# Patient Record
Sex: MALE | Race: OTHER | Employment: FULL TIME | URBAN - METROPOLITAN AREA
[De-identification: names, ages, dates, MRNs, and addresses within clinical notes are randomized per-mention and may not be internally consistent; named-entity substitution may affect disease eponyms.]

---

## 2018-03-01 ENCOUNTER — OFFICE VISIT (OUTPATIENT)
Dept: FAMILY MEDICINE CLINIC | Age: 53
End: 2018-03-01

## 2018-03-01 VITALS
SYSTOLIC BLOOD PRESSURE: 116 MMHG | BODY MASS INDEX: 23.3 KG/M2 | DIASTOLIC BLOOD PRESSURE: 72 MMHG | WEIGHT: 145 LBS | HEIGHT: 66 IN | TEMPERATURE: 97.9 F | HEART RATE: 53 BPM

## 2018-03-01 DIAGNOSIS — Z23 ENCOUNTER FOR IMMUNIZATION: ICD-10-CM

## 2018-03-01 DIAGNOSIS — Z13.9 ENCOUNTER FOR SCREENING: ICD-10-CM

## 2018-03-01 DIAGNOSIS — R39.15 URGENCY OF URINATION: Primary | ICD-10-CM

## 2018-03-01 LAB
BILIRUB UR QL STRIP: NEGATIVE
GLUCOSE POC: NORMAL MG/DL
GLUCOSE UR-MCNC: NEGATIVE MG/DL
KETONES P FAST UR STRIP-MCNC: NEGATIVE MG/DL
PH UR STRIP: 5 [PH] (ref 4.6–8)
PROT UR QL STRIP: NEGATIVE
SP GR UR STRIP: 1.02 (ref 1–1.03)
UA UROBILINOGEN AMB POC: NORMAL (ref 0.2–1)
URINALYSIS CLARITY POC: CLEAR
URINALYSIS COLOR POC: YELLOW
URINE BLOOD POC: NEGATIVE
URINE LEUKOCYTES POC: NEGATIVE
URINE NITRITES POC: NEGATIVE

## 2018-03-01 RX ORDER — OXYBUTYNIN CHLORIDE 5 MG/1
5 TABLET ORAL 2 TIMES DAILY
Qty: 60 TAB | Refills: 11 | Status: SHIPPED | OUTPATIENT
Start: 2018-03-01 | End: 2018-07-21

## 2018-03-01 NOTE — PROGRESS NOTES
Coordination of Care  1. Have you been to the ER, urgent care clinic since your last visit? Hospitalized since your last visit? No    2. Have you seen or consulted any other health care providers outside of the [address] [place] [address] since your last visit? Include any pap smears or colon screening. No    Does the patient need refills?  YES    Learning Assessment Complete? yes     Results for orders placed or performed in visit on 03/01/18   AMB POC URINALYSIS DIP STICK MANUAL W/O MICRO   Result Value Ref Range    Color (UA POC) Yellow     Clarity (UA POC) Clear     Glucose (UA POC) Negative Negative    Bilirubin (UA POC) Negative Negative    Ketones (UA POC) Negative Negative    Specific gravity (UA POC) 1.025 1.001 - 1.035    Blood (UA POC) Negative Negative    pH (UA POC) 5.0 4.6 - 8.0    Protein (UA POC) Negative Negative    Urobilinogen (UA POC) normal 0.2 - 1    Nitrites (UA POC) Negative Negative    Leukocyte esterase (UA POC) Negative Negative   AMB POC GLUCOSE BLOOD, BY GLUCOSE MONITORING DEVICE   Result Value Ref Range    Glucose POC non fasting 87 mg/dL

## 2018-03-01 NOTE — PROGRESS NOTES
Safend phone  #[phone]. Requests flu vaccine; denies fever, egg allergy. Immunization given per protocol and recorded in [address] [place]. VIS information sheet given, explained possible S/E. Reviewed sx indicating need to be seen in ER. Pt had no adverse reaction at time of discharge. See scanned form. AVS printed and reviewed. E script discussed and Good Rx coupon printed for cost at [facility] approx $19. F/u as needed.

## 2018-03-01 NOTE — PROGRESS NOTES
Subjective:     Chief Complaint   Patient presents with    Enuresis        He  is a 47 y.o. male who presents for evaluation of urinary frequency. Pt reports going to the bathroom q2-3 hours during the day w/ 2-3 nocturnal awakenings. S&S resume approx 2 months ago. Original onset approx 3-4 years ago and was Rx'd by Ditropan by urologist.     Pt reports ditropan was effective in normalizing his urine output. No associated dysuria, pelvic pain nor abdominal pain. No other acute concerns. ROS  Gen - no fever/chills  Resp - no dyspnea or cough  CV - no chest pain or GALLARDO  Rest per HPI    No past medical history on file. No past surgical history on file. Current Outpatient Prescriptions on File Prior to Visit   Medication Sig Dispense Refill    raNITIdine (ZANTAC) 75 mg tablet Take 75 mg by mouth two (2) times a day.  oxybutynin (DITROPAN) 5 mg tablet Take 1 Tab by mouth three (3) times daily for 90 days. [place] 1 tableta 3 veces al jud 90 Tab 2     No current facility-administered medications on file prior to visit.          Objective:     Vitals:    03/01/18 1134   BP: 116/72   Pulse: (!) 53   Temp: 97.9 °F (36.6 °C)   Weight: 145 lb (65.8 kg)   Height: 5' 6.34\" (1.685 m)       Physical Examination:  General appearance - alert, well appearing, and in no distress    Recent Results (from the past 12 hour(s))   AMB POC GLUCOSE BLOOD, BY GLUCOSE MONITORING DEVICE    Collection Time: 03/01/18 11:40 AM   Result Value Ref Range    Glucose POC non fasting 87 mg/dL   AMB POC URINALYSIS DIP STICK MANUAL W/O MICRO    Collection Time: 03/01/18 11:51 AM   Result Value Ref Range    Color (UA POC) Yellow     Clarity (UA POC) Clear     Glucose (UA POC) Negative Negative    Bilirubin (UA POC) Negative Negative    Ketones (UA POC) Negative Negative    Specific gravity (UA POC) 1.025 1.001 - 1.035    Blood (UA POC) Negative Negative    pH (UA POC) 5.0 4.6 - 8.0    Protein (UA POC) Negative Negative Urobilinogen (UA POC) normal 0.2 - 1    Nitrites (UA POC) Negative Negative    Leukocyte esterase (UA POC) Negative Negative           Assessment/ Plan:   Diagnoses and all orders for this visit:    1. Urgency of urination  -     oxybutynin (DITROPAN) 5 mg tablet; Take 1 Tab by mouth two (2) times a day. [place] 1 tableta dos veces al jud    2. Encounter for screening  -     AMB POC URINALYSIS DIP STICK MANUAL W/O MICRO  -     AMB POC GLUCOSE BLOOD, BY GLUCOSE MONITORING DEVICE       Reviewed prior urology note and recommend resuming Ditropan BID. Advised Pt if his S&S do not improve w/ Rx use, that he needs to RTC for further eval.     POC testing unremarkable. I have discussed the diagnosis with the patient and the intended plan as seen in the above orders. The patient has received an after-visit summary and questions were answered concerning future plans. I have discussed medication side effects and warnings with the patient as well. The patient verbalizes understanding and agreement with the plan.     Follow-up Disposition: Not on File

## 2018-03-01 NOTE — PATIENT INSTRUCTIONS
Hiperplasia prostática benigna: Instrucciones de cuidado - [ Benign Prostatic Hyperplasia: Care Instructions ]  Instrucciones de cuidado    La hiperplasia prostática benigna (BPH, por sp siglas [facility] es un agrandamiento de la próstata. La próstata es nata glándula pequeña que produce parte del líquido del semen. El agrandamiento de la próstata ocurre en eliazar todos los hombres a medida que envejecen. Por lo general, no es grave. La BPH no causa cáncer de próstata. A medida que la próstata se agranda, podría obstruir de [place] parcial el flujo de la [place]. Es posible que tenga dificultades para empezar o detener por completo el flujo de [place]. La BPH puede causar goteo de [place]. Es posible que tenga un flujo débil de [place], o que tenga que orinar con más frecuencia de lo habitual, sobre todo por la noche. La mayoría de los hombres encuentran que estos problemas son fáciles de [place]. Usted no necesita tratamiento a menos que sp síntomas le molesten mucho o que tenga otros problemas, tales cadence infecciones en la vejiga o cálculos. En [facility], los medicamentos podrían ayudar. No se necesita [facility], a menos que el flujo de [place] esté bloqueado o los síntomas no mejoren con los [employer]. La atención de seguimiento es nata parte clave de swenson tratamiento y seguridad. Asegúrese de hacer y acudir a todas las citas, y llame a swenson médico si está teniendo problemas. También es nata buena idea saber los resultados de los exámenes y mantener nata lista de los medicamentos que gabi. ¿Cómo puede cuidarse en el hogar? · Tómese tiempo suficiente para orinar. Trate de relajarse. · Intente hacer la \"doble micción\". Orine todo lo que pueda, relájese sherri algunos momentos y [place] trate de volver a orinar. · Siéntese en el inodoro para orinar. · Melissa o piense en otras cosas mientras espera. · Wanda Marrow llave de agua o trate de imaginarse el agua corriendo.  A algunos hombres esto les ayuda a comenzar el flujo de orina.  · Si el goteo es un problema, lávese el pene a diario para evitar la irritación e infección de la piel. · Evite la cafeína y el alcohol. Estas bebidas aumentarán la frecuencia con la que necesita orinar. Distribuya el consumo de líquidos a lo ashleigh del día. Si el deseo urgente de orinar le despierta con frecuencia por la noche, limite el consumo de líquidos por la tarde. Orine loraine antes de ir a la cama. · Muchos medicamentos de venta nilda para el resfriado y las alergias pueden empeorar los síntomas de la BPH. Si puede, evite los antihistamínicos, los descongestionantes y las pastillas para las [place]. Melissa las advertencias del envase. · Si gabi algún medicamento recetado, en especial tranquilizantes o antidepresivos, pregúntele a swenson médico o farmacéutico si pueden causarle problemas urinarios. Podría radhika otros medicamentos que puede usar que no causan problemas urinarios. · Sea aron con los medicamentos. [employer] medicamentos exactamente cadence le fueron recetados. Llame a swenson médico si martin estar teniendo problemas con swenson medicamento. ¿Cuándo debe pedir ayuda? Llame a swenson médico ahora mismo o busque atención médica inmediata si:  ? · No puede orinar en absoluto. ? · Presenta síntomas de nata infección urinaria. Por ejemplo:  ¨ Tiene rose o pus en la orina. ¨ Tiene dolor de espalda loraine debajo de las [place]. Encantado se llama dolor en el flanco.  ¨ Tiene fiebre, escalofríos o pina por todo el cuerpo. ¨ Siente dolor al [place]. ¨ Tiene dolor en el abdomen o la jacquelin. ?Preste especial atención a los cambios en swenson kimberley y asegúrese de comunicarse con swenson médico si:  ? · Siente dolor cuando [place]. ? · Dary problemas urinarios empeoran mucho o le [place]. ¿Dónde puede encontrar más información en inglés? Deedee Burdick a http://bulmaro-nadira.info/. Glenn  D122 en la búsqueda para aprender más acerca de \"Hiperplasia prostática benigna:  Instrucciones de cuidado - [ Benign Prostatic Hyperplasia: Care Instructions ]. \"  Revisado: 14 marzo, 2017  Versión del contenido: 11.4  © 5316-4596 Healthwise, Incorporated. Las instrucciones de cuidado fueron adaptadas bajo licencia por [employer] (which disclaims liability or warranty for this information). Si usted tiene Esperance Russell afección médica o sobre estas instrucciones, siempre pregunte a swenson profesional de kimberley. Healthwise, Incorporated niega toda garantía o responsabilidad por swenson uso de esta información.

## 2018-07-21 ENCOUNTER — OFFICE VISIT (OUTPATIENT)
Dept: FAMILY MEDICINE CLINIC | Age: 53
End: 2018-07-21

## 2018-07-21 VITALS
TEMPERATURE: 98.3 F | SYSTOLIC BLOOD PRESSURE: 117 MMHG | BODY MASS INDEX: 23.61 KG/M2 | HEIGHT: 67 IN | DIASTOLIC BLOOD PRESSURE: 71 MMHG | WEIGHT: 150.4 LBS | HEART RATE: 53 BPM

## 2018-07-21 DIAGNOSIS — Z13.9 ENCOUNTER FOR SCREENING: Primary | ICD-10-CM

## 2018-07-21 DIAGNOSIS — M54.9 MECHANICAL BACK PAIN: ICD-10-CM

## 2018-07-21 DIAGNOSIS — R05.9 COUGH: ICD-10-CM

## 2018-07-21 DIAGNOSIS — R35.1 NOCTURIA: ICD-10-CM

## 2018-07-21 LAB
S PYO AG THROAT QL: NEGATIVE
VALID INTERNAL CONTROL?: YES

## 2018-07-21 RX ORDER — DOXAZOSIN 1 MG/1
1 TABLET ORAL DAILY
Qty: 30 TAB | Refills: 2 | Status: SHIPPED | OUTPATIENT
Start: 2018-07-21

## 2018-07-21 RX ORDER — LORATADINE 10 MG/1
10 TABLET ORAL DAILY
Qty: 30 TAB | Refills: 0 | Status: SHIPPED | OUTPATIENT
Start: 2018-07-21

## 2018-07-21 RX ORDER — NAPROXEN 500 MG/1
500 TABLET ORAL 2 TIMES DAILY WITH MEALS
Qty: 60 TAB | Refills: 1 | Status: SHIPPED | OUTPATIENT
Start: 2018-07-21

## 2018-07-21 NOTE — PROGRESS NOTES
Coordination of Care  1. Have you been to the ER, urgent care clinic since your last visit? Hospitalized since your last visit? No    2. Have you seen or consulted any other health care providers outside of the [address] [place] [address] since your last visit? Include any pap smears or colon screening. No    Does the patient need refills? YES    Learning Assessment Complete?  yes  Depression Screening complete in the past 12 months? yes     Results for orders placed or performed in visit on 07/21/18   AMB POC RAPID STREP A   Result Value Ref Range    VALID INTERNAL CONTROL POC Yes     Group A Strep Ag Negative Negative

## 2018-07-21 NOTE — PATIENT INSTRUCTIONS
Tos crónica: Instrucciones de cuidado - [ Chronic Cough: Care Instructions ]  Instrucciones de cuidado    La tos es nata respuesta del cuerpo a algo que molesta a la garganta o a las vías respiratorias. La tos puede ser provocada por muchas cosas. Usted podría toser debido a un resfriado o a la gripe, a nata bronquitis o al asma. Fumar, el goteo posnasal, las alergias y el ácido estomacal que regresa a swenson garganta también pueden causar tos. La tos puede ser a corto plazo (aguda) o a ashleigh plazo (crónica). La tos crónica dura más de 3 semanas. La tos crónica suele estar causada por un problema a ashleigh plazo, cadence, por ejemplo, el asma. Otra causa puede ser un medicamento, yogesh cadence un inhibidor de la ECA (enzima convertidora de la angiotensina). La tos es un síntoma, no nata enfermedad. Para tratar la tos crónica, es posible que tenga que tratar el problema que la causa. Puede jagdish algunas medidas en swenson hogar para toser menos y sentirse mejor. Algunas personas tosen o carraspean por costumbre, sin razón aparente. La atención de seguimiento es nata parte clave de swenson tratamiento y seguridad. Asegúrese de hacer y acudir a todas las citas, y llame a swenson médico si está teniendo problemas. También es nata buena idea saber los resultados de sp exámenes y mantener nata lista de los medicamentos que gabi. ¿Cómo puede cuidarse en el hogar? · Sabrina abundante agua y otros líquidos. [place] podría ayudar a aliviar la garganta seca o adolorida. La miel o el jugo de michelle en [place] o té podrían aliviar nata tos seca. · Eleve la amando sobre almohadas para que le ayude a respirar y a aliviar la tos. · No fume ni permita que otros fumen a swenson alrededor. Fumar puede empeorar la tos. Si necesita ayuda para dejar el hábito, hable con swenson médico sobre programas y medicamentos para dejar de fumar. Estos pueden aumentar sp probabilidades de dejar de fumar para siempre.   · Evite la exposición al humo, al polvo o a otros contaminantes, o lleve puesta nata mascarilla facial. Consulte a swenson médico o farmacéutico para determinar qué tipo de mascarilla facial le proporcionará el demian beneficio. · [employer] medicamentos para la tos según las indicaciones de swenson médico.  · Pruebe pastillas para la tos para aliviar la garganta seca o adolorida. Las pastillas para la tos no detienen la tos. Las pastillas para la tos con sabor a medicamento no son mejores que las pastillas con sabor a aniceto o los caramelos duros. Carraspeo  Cuando usted tiene tos crónica o nata enfermedad que puede causar shila tipo de tos, es posible que sienta la necesidad de carraspear con frecuencia. [place] ayuda a [employer]. Blayne el carraspeo no siempre tiene [employer]. El carraspeo puede convertirse en hábito. Cuanto más lo hace, más necesidad siente de tener que [place]. [place]ne el carraspeo frecuente puede ser krupa para sp cuerdas vocales. Es cadence golpearlas nata contra la otra. Para ayudar a reducir el carraspeo, usted puede probar a:  · Sam pequeños sorbos de [place]. · No carraspear cuando sienta que necesita hacerlo. · Tragar con fuerza cuando Lilli Duet. Quizás le [employer] preguntarle a swenson médico si un medicamento para diluir la Chris [place]ja. ¿Cuándo debe pedir ayuda? Llame al 911 en cualquier momento que piense que puede necesitar atención de [place]. Por ejemplo, llame si:    · Tiene graves dificultades para respirar.    Llame a swenson médico ahora mismo o busque atención médica inmediata si:    · Tose rose.     · Tiene dificultad nueva o peor para respirar.     · Tiene fiebre nueva o más martha.    Vigile muy de cerca los cambios en swenson kimberley, y asegúrese de comunicarse con swenson médico si:    · Tose con mayor profundidad o frecuencia, sobre todo si nota más mucosidad o un cambio en el color de la mucosidad.     · No mejora cadence se esperaba. ¿Dónde puede encontrar más información en inglés?   Munir Kemp a http://bulmaro-nadira.info/. Yordan Terry O082 en la búsqueda para aprender más acerca de \"Tos crónica: Instrucciones de cuidado - [ Chronic Cough: Care Instructions ]. \"  Revisado: 6 diciembre, 2017  Versión del contenido: 11.7  © 4185-8141 [employer]. Las instrucciones de cuidado fueron adaptadas bajo licencia por Good Help Connections (which disclaims liability or warranty for this information). Si usted tiene [place] afección médica o sobre estas instrucciones, siempre pregunte a swenson profesional de kimberley. [employer] niega toda garantía o responsabilidad por swenson uso de esta información.

## 2018-07-21 NOTE — PROGRESS NOTES
Patient seen for discharge with assistance from Oneida Cardenas, . We reviewed the AVS, prescriptions and pharmacy location. Per the Good Rx webiste, no coupon needed to get the best price at [facility]. The patient expressed understanding and will go now to registration to schedule a 2 month provider follow-up appointment.  Janet Li RN

## 2018-07-21 NOTE — PROGRESS NOTES
Assessment/Plan:    Diagnoses and all orders for this visit:    1. Encounter for screening  -     AMB POC RAPID STREP A    2. Cough  -     loratadine (CLARITIN) 10 mg tablet; Take 1 Tab by mouth daily. [place] 1 pastilla al jud por swenson tos    3. Nocturia  -     doxazosin (CARDURA) 1 mg tablet; Take 1 Tab by mouth daily. [place] 1 pastilla al jud por swenson prostate    4. Mechanical back pain  -     naproxen (NAPROSYN) 500 mg tablet; Take 1 Tab by mouth two (2) times daily (with meals). [place] 1 [place] [employer] veces al jud con comida por swenson dolor espalda    Stop ditropan (side effect of cough listed between 1-5% of users of ditropan)  and start cardura     Follow-up Disposition:  Return in about 2 months (around 9/21/2018). ADELINE Gloria expressed understanding of this plan. An AVS was printed and given to the patient.      ----------------------------------------------------------------------    Chief Complaint   Patient presents with    Cough     x3 months. Itchy throat, Clear/white phlegm    LOW BACK PAIN       History of Present Illness: The pt is here for cough for 4 months, dry w/out fever. The cough seems to originate at the base of his neck. He is a non smoker. He states that he has not had a fever. No medication that he has tried OTC has worked. He states that no one else at home has this problem. The cough seems to be worse at day and he states that he sleeps well at night. When I ask about the temporal association with the ditropan he states that this could be the same timing. He has only been taking the ditropan once daily, in the morning, not 3 times daily as recommended    He does not have LBP but rather magalys trapezius pain (upper back). No injury. 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 He is not taking anything for the pain     No past medical history on file.     Current Outpatient Prescriptions   Medication Sig Dispense Refill    doxazosin (CARDURA) 1 mg tablet Take 1 Tab by mouth daily. [place] 1 pastilla al jud por swenson prostate 30 Tab 2    loratadine (CLARITIN) 10 mg tablet Take 1 Tab by mouth daily. [place] 1 pastilla al jud por swenson tos 30 Tab 0    naproxen (NAPROSYN) 500 mg tablet Take 1 Tab by mouth two (2) times daily (with meals). [place] 1 [employer] veces al jud con comida por swenson dolor espalda 60 Tab 1    raNITIdine (ZANTAC) 75 mg tablet Take 75 mg by mouth two (2) times a day. No Known Allergies    Social History   Substance Use Topics    Smoking status: Never Smoker    Smokeless tobacco: Never Used    Alcohol use No       No family history on file.     Physical Exam:     Visit Vitals    /71 (BP 1 Location: Left arm)    Pulse (!) 53    Temp 98.3 °F (36.8 °C) (Oral)    Ht 5' 6.53\" (1.69 m)    Wt 150 lb 6.4 oz (68.2 kg)    BMI 23.89 kg/m2     Dry cough frequently throughout the exam time   A&Ox3  WDWN NAD  Respirations normal and non labored  Lungs are CTA magalys  TM's show magalys old scars mid TM, otherwise normal  Nares patent  OP mild injection- strep neg and sxs do not suggest pharyngitis

## 2020-06-02 ENCOUNTER — APPOINTMENT (OUTPATIENT)
Dept: GENERAL RADIOLOGY | Age: 55
End: 2020-06-02
Attending: EMERGENCY MEDICINE
Payer: OTHER MISCELLANEOUS

## 2020-06-02 ENCOUNTER — HOSPITAL ENCOUNTER (EMERGENCY)
Age: 55
Discharge: HOME | End: 2020-06-02
Attending: EMERGENCY MEDICINE
Payer: OTHER MISCELLANEOUS

## 2020-06-02 ENCOUNTER — HOSPITAL ENCOUNTER (OUTPATIENT)
Dept: GENERAL RADIOLOGY | Age: 55
Discharge: HOME | End: 2020-06-02
Payer: OTHER MISCELLANEOUS

## 2020-06-02 VITALS
BODY MASS INDEX: 22.73 KG/M2 | OXYGEN SATURATION: 99 % | HEART RATE: 76 BPM | TEMPERATURE: 98 F | DIASTOLIC BLOOD PRESSURE: 84 MMHG | SYSTOLIC BLOOD PRESSURE: 128 MMHG | WEIGHT: 150 LBS | HEIGHT: 68 IN | RESPIRATION RATE: 16 BRPM

## 2020-06-02 DIAGNOSIS — T14.8XXA FRACTURE: ICD-10-CM

## 2020-06-02 DIAGNOSIS — S92.061A CLOSED DISPLACED INTRA-ARTICULAR FRACTURE OF RIGHT CALCANEUS, INITIAL ENCOUNTER: Primary | ICD-10-CM

## 2020-06-02 PROCEDURE — 73630 X-RAY EXAM OF FOOT: CPT

## 2020-06-02 PROCEDURE — 73650 X-RAY EXAM OF HEEL: CPT

## 2020-06-02 PROCEDURE — 74011250637 HC RX REV CODE- 250/637: Performed by: EMERGENCY MEDICINE

## 2020-06-02 PROCEDURE — 73610 X-RAY EXAM OF ANKLE: CPT

## 2020-06-02 PROCEDURE — 72100 X-RAY EXAM L-S SPINE 2/3 VWS: CPT

## 2020-06-02 PROCEDURE — 99284 EMERGENCY DEPT VISIT MOD MDM: CPT

## 2020-06-02 PROCEDURE — 75810000053 HC SPLINT APPLICATION

## 2020-06-02 RX ORDER — IBUPROFEN 800 MG/1
800 TABLET ORAL
Qty: 20 TAB | Refills: 0 | Status: SHIPPED | OUTPATIENT
Start: 2020-06-02

## 2020-06-02 RX ORDER — IBUPROFEN 400 MG/1
800 TABLET ORAL
Status: COMPLETED | OUTPATIENT
Start: 2020-06-02 | End: 2020-06-02

## 2020-06-02 RX ORDER — TRAMADOL HYDROCHLORIDE 50 MG/1
50 TABLET ORAL
Status: COMPLETED | OUTPATIENT
Start: 2020-06-02 | End: 2020-06-02

## 2020-06-02 RX ORDER — HYDROCODONE BITARTRATE AND ACETAMINOPHEN 5; 325 MG/1; MG/1
1 TABLET ORAL
Qty: 18 TAB | Refills: 0 | Status: SHIPPED | OUTPATIENT
Start: 2020-06-02 | End: 2020-06-05

## 2020-06-02 RX ADMIN — TRAMADOL HYDROCHLORIDE 50 MG: 50 TABLET, FILM COATED ORAL at 13:36

## 2020-06-02 RX ADMIN — IBUPROFEN 800 MG: 400 TABLET, FILM COATED ORAL at 13:36

## 2020-06-02 NOTE — ED PROVIDER NOTES
EMERGENCY DEPARTMENT HISTORY AND PHYSICAL EXAM      Date: 6/2/2020  Patient Name: Cherelle Valle    History of Presenting Illness     Chief Complaint   Patient presents with    Fall     six feet       History Provided By: Patient    HPI: Cherelle Valle, 53 y.o. male presents to the ED with cc of moderate, acute Rt ankle pain with associated swelling secondary to a fall occurring ~ 1 hour PTA. Pt reports he was ~ 6 ft in the air while at work, when he fell and twisted his ankle. Pt denies any modifying factors. He denies any known allergies. He specifically denies any fevers, chills, nausea, vomiting, chest pain, shortness of breath, headache, rash, diarrhea, sweating or weight loss. There are no other complaints, changes, or physical findings at this time. PCP: None    No current facility-administered medications on file prior to encounter. Current Outpatient Medications on File Prior to Encounter   Medication Sig Dispense Refill    raNITIdine (ZANTAC) 75 mg tablet Take 75 mg by mouth two (2) times a day.  doxazosin (CARDURA) 1 mg tablet Take 1 Tab by mouth daily. [place] 1 pastilla al jud por swenson prostate 30 Tab 2    loratadine (CLARITIN) 10 mg tablet Take 1 Tab by mouth daily. [place] 1 pastilla al jud por swenson tos 30 Tab 0    naproxen (NAPROSYN) 500 mg tablet Take 1 Tab by mouth two (2) times daily (with meals). [place] [facility] [employer] [employer] veces al jud con comida por swenson dolor espalda 60 Tab 1       Past History     Past Medical History:  History reviewed. No pertinent past medical history. Past Surgical History:  History reviewed. No pertinent surgical history. Family History:  History reviewed. No pertinent family history.     Social History:  Social History     Tobacco Use    Smoking status: Never Smoker    Smokeless tobacco: Never Used   Substance Use Topics    Alcohol use: No     Alcohol/week: 0.0 standard drinks    Drug use: No       Allergies:  No Known Allergies      Review of Systems   Review of Systems   Constitutional: Negative for fever. HENT: Negative for sore throat and trouble swallowing. Eyes: Negative for photophobia and redness. Respiratory: Negative for cough and shortness of breath. Cardiovascular: Negative for chest pain and leg swelling. Gastrointestinal: Negative for abdominal pain, constipation, diarrhea, nausea and vomiting. Endocrine: Negative for polydipsia and polyuria. Genitourinary: Negative for dysuria, hematuria and scrotal swelling. Musculoskeletal: Positive for joint swelling and myalgias. Negative for back pain. Skin: Negative for rash. Neurological: Negative for dizziness, syncope, weakness and headaches. Psychiatric/Behavioral: Negative for suicidal ideas. All other systems reviewed and are negative. Physical Exam   Physical Exam  Vitals signs and nursing note reviewed. Constitutional:       General: He is not in acute distress. Appearance: He is well-developed. HENT:      Head: Normocephalic and atraumatic. Mouth/Throat:      Pharynx: No oropharyngeal exudate. Eyes:      General:         Left eye: No discharge. Conjunctiva/sclera: Conjunctivae normal.      Pupils: Pupils are equal, round, and reactive to light. Neck:      Musculoskeletal: Normal range of motion and neck supple. Vascular: No JVD. Cardiovascular:      Rate and Rhythm: Normal rate and regular rhythm. Heart sounds: Normal heart sounds. Pulmonary:      Effort: Pulmonary effort is normal. No respiratory distress. Breath sounds: Normal breath sounds. No wheezing. Abdominal:      General: Bowel sounds are normal. There is no distension. Palpations: Abdomen is soft. Tenderness: There is no abdominal tenderness. There is no guarding or rebound. Musculoskeletal: Normal range of motion. General: Swelling and tenderness present. Feet:      Comments: Rt foot swollen with TTP.  Limited ROM dt/ pain   Lymphadenopathy: Cervical: No cervical adenopathy. Skin:     General: Skin is warm and dry. Findings: No rash. Neurological:      Mental Status: He is alert and oriented to person, place, and time. Cranial Nerves: No cranial nerve deficit. Deep Tendon Reflexes: Reflexes are normal and symmetric. Psychiatric:         Behavior: Behavior normal.         Diagnostic Study Results     Labs -   No results found for this or any previous visit (from the past 12 hour(s)). Radiologic Studies -   XR SPINE LUMB 2 OR 3 V   Final Result   IMPRESSION: No fracture seen. Impression:    IMPRESSION: No fracture seen. Narrative:    EXAM: XR SPINE LUMB 2 OR 3 V    INDICATION: Pain after fall    COMPARISON: None. FINDINGS: AP, lateral and spot lateral views of the lumbar spine. There is normal alignment. The vertebral body heights  are well-preserved. There  is a mild spondylitic change at L3-4 and L4-5. There is no fracture, subluxation  or other abnormality. EXAM: XR ANKLE RT MIN 3 V     INDICATION: Fracture.     COMPARISON: None.     FINDINGS: AP, lateral and oblique views of the right ankle were obtained. There  is evidence of soft tissue swelling involving the ankle. The bones of the ankle  (proper) are unremarkable. A comminuted calcaneal fracture is present. The base  of the fifth metatarsal appears be intact.     IMPRESSION  IMPRESSION:  1. Evidence of soft tissue swelling involving the ankle. 2. No evidence of acute fracture or dislocation involving the ankle (proper). 3. Presence of a comminuted calcaneal fracture. EXAM: XR CALCANEUS RT     INDICATION: . Fracture     COMPARISON: None.     FINDINGS: Axial and lateral views of the calcaneus were obtained. A comminuted  calcaneal fracture is present.     IMPRESSION  IMPRESSION: Presence of a comminuted calcaneal fracture.    INDICATION: Fracture.      Exam: AP, lateral, oblique views of the right foot.     FINDINGS: There is an acute mildly displaced comminuted fracture of the  calcaneus; fracture is intra-articular, extending to the posterior facet and  sustentaculum boaz. No additional fracture is seen. Soft tissue swelling  overlies the hindfoot. Bones are well-mineralized.     IMPRESSION  IMPRESSION: Acute calcaneal fracture, as described above. CT can be performed  for further characterization, as dictated. Medical Decision Making   I am the first provider for this patient. I reviewed the vital signs, available nursing notes, past medical history, past surgical history, family history and social history. Vital Signs-Reviewed the patient's vital signs. Patient Vitals for the past 12 hrs:   Temp Pulse Resp BP SpO2   06/02/20 1320 98 °F (36.7 °C) 89 16 129/81 98 %       Records Reviewed: Nursing Notes, Old Medical Records, Previous Radiology Studies and Previous Laboratory Studies    Provider Notes (Medical Decision Making): Foot Fx, Foot contusion, Ankle Fx, compression fx of lumbar spine     ED Course:   Initial assessment performed. The patients presenting problems have been discussed, and they are in agreement with the care plan formulated and outlined with them. I have encouraged them to ask questions as they arise throughout their visit. Critical Care Time: 0    Disposition:  Discharge Note:  2:12 PM  The pt is ready for discharge. The pt's signs, symptoms, diagnosis, and discharge instructions have been discussed and pt has conveyed their understanding. The pt is to follow up as recommended or return to ER should their symptoms worsen. Plan has been discussed and pt is in agreement. DISCHARGE PLAN:  1. Current Discharge Medication List        2. Follow-up Information     Follow up With Specialties Details [address]  In 3 days  [address]  [phone]        3. Return to ED if worse     Diagnosis     Clinical Impression:   1. Closed displaced intra-articular fracture of right calcaneus, initial encounter        Attestations: This note is prepared by Ridge Nix, acting as Scribe for  Tonya Faustin MD.     Tonya Faustin MD: The scribe's documentation has been prepared under my direction and personally reviewed by me in its entirety.  I confirm that the note above accurately reflects all work, treatment, procedures, and medical decision making performed by me

## 2020-06-02 NOTE — ED TRIAGE NOTES
Fall from 6 feet resulting in twisting left ankle this morning, patient has not taken any medications.

## 2020-06-02 NOTE — ED NOTES
Discharge instructions were given to the patient by Karina Patel.     The patient left the Emergency Department ambulatory, alert and oriented and in no acute distress with 2 prescriptions. The patient was encouraged to call or return to the ED for worsening issues or problems and was encouraged to schedule a follow up appointment for continuing care. The patient verbalized understanding of discharge instructions and prescriptions, all questions were answered. The patient has no further concerns at this time.

## 2020-06-02 NOTE — LETTER
[facility] EMERGENCY DEPT 
[address] [address] 
[phone] Work/School Note Date: 6/2/2020 To Whom It May concern: Francisco Javier Gage was seen and treated today in the emergency room by the following provider(s): 
Attending Provider: Ellie Ramirez MD. Francisco Javier Gage may remain out of work until instructed to return by the Orthopedic. Sincerely, Abran Goetz RN

## 2020-06-02 NOTE — ED NOTES
Pt presents ambulatory to ED complaining of right foot pain beginning at 1100 today after falling off scaffolding. Pt denies hitting head, denies LOC. Pt denies taking medication for pain PTA. Pt is alert and oriented x 4, RR even and unlabored, skin is warm and dry. Assesment completed and pt updated on plan of care. Emergency Department Nursing Plan of Care       The Nursing Plan of Care is developed from the Nursing assessment and Emergency Department Attending provider initial evaluation. The plan of care may be reviewed in the ED Provider note.     The Plan of Care was developed with the following considerations:   Patient / Family readiness to learn indicated by:verbalized understanding  Persons(s) to be included in education: patient  Barriers to Learning/Limitations:No    [address]    6/2/2020   1:46 PM

## 2020-06-02 NOTE — DISCHARGE INSTRUCTIONS
Patient Education        Fractura de pie: Instrucciones de cuidado  Broken Foot: Care Instructions  Instrucciones de cuidado    [place] fractura de pie es la rotura de carolee o más huesos del pie. Puede ocurrir debido a nata lesión deportiva, nata caída u otro accidente. Nata fractura expuesta o abierta se produce cuando el hueso fracturado atraviesa la piel. Si el hueso no [employer], se trata de nata fractura cerrada. El tratamiento depende de la ubicación y del tipo de fractura en el pie. Podría necesitar nata tablilla (férula), un yeso o un zapato ortopédico. Ciertos tipos de lesiones podrían necesitar nata cirugía en algún momento. Cualquiera que sea el tratamiento, puede aliviar los síntomas y ayudar a swenson pie a sanar con cuidados en el hogar. Podría necesitar entre 6 y 6 semanas o más para sanar por completo. Usted sanará mejor si cuida jaylin de sí mismo. Coma nata variedad de alimentos saludables y no fume. La atención de seguimiento es nata parte clave de swenson tratamiento y seguridad. Asegúrese de hacer y acudir a todas las citas, y llame a swenson médico si está teniendo problemas. También es nata buena idea saber los resultados de sp exámenes y mantener nata lista de los medicamentos que gabi. ¿Cómo puede cuidarse en el hogar? · Sea aron con los medicamentos. Danielson los analgésicos (medicamentos para el dolor) exactamente según las indicaciones. ? Si el médico le recetó analgésicos, tómelos según las indicaciones. ? Si no está tomando un analgésico recetado, pregúntele a swenson médico si puede jagdish carolee de [employer]. · Déjese la tablilla hasta la colette de seguimiento. No ponga ningún peso en el pie lesionado. Si le dieron muletas, úselas según las indicaciones. · Colóquese hielo o nata compresa fría en el pie de 10 a 20 minutos cada sesión. Trate de hacerlo cada 1 o 2 horas sherri los siguientes 3 días (cuando esté despierto) o hasta que la hinchazón baje. Póngase un paño schwab entre el hielo y la piel.   · Eleve el pie adolorido sobre nata almohada cada vez que se siente o acueste sherri los siguientes 3 días. Trate de mantenerlo por encima del nivel del corazón. [place] ayudará a reducir la hinchazón. · Siga las instrucciones de cuidado del yeso que le indique swenson médico. Si tiene nata tablilla, no se la quite a menos que swenson médico se lo indique. Cuidado del yeso y la tablilla  · Si tiene nata tablilla removible, pregúntele a swenson médico si está jaylin que se la quite para bañarse. Swenson médico podría indicarle que la use todo el tiempo que sea posible. · Cuando se bañe, cubra la tablilla de yeso con plástico y sujételo con [place]. El agua debajo del yeso puede hacer que le dé comezón y dolor en la piel. · Nunca latonya la tablilla. ¿Cuándo debe pedir ayuda? Llame [id number] en cualquier momento que considere que necesita atención de [place]. Por ejemplo, llame si:  · Tiene dolor repentino en el pecho y falta de [place], o tose rose. Llame a swenson médico ahora mismo o busque atención médica inmediata si:  · Tiene más dolor o dolor intenso. · Los dedos del pie están fríos o pálidos, o [place] de color. · Tiene hormigueo, debilidad o entumecimiento en el pie. · El yeso o la tablilla siente demasiado apretado. · No puede  los dedos de los pies. · Tiene señales de un coágulo de [place], tales cadence:  ? Dolor en la pantorrilla, el muslo, la jacquelin o detrás de la rodilla. ? Enrojecimiento o hinchazón en la pierna o la jacquelin. Preste especial atención a los cambios en swenson [place] y asegúrese de comunicarse con swenson médico si:  · El dolor no mejora en 2 o 3 días. ¿Dónde puede encontrar más información en inglés? Ulisses Smith a http://bulmaro-nadira.info/  Meg Signs I180 en la búsqueda para aprender más acerca de \"Fractura de pie: Instrucciones de cuidado. \"  Revisado: 2 marzo, 2020               Versión del contenido: 12.5  © 2636-8029 [employer], [employer].    Las instrucciones de cuidado fueron adaptadas bajo licencia por [facility] (which disclaims liability or warranty for this information). Si usted tiene [place] afección médica o sobre estas instrucciones, siempre pregunte a swenson profesional de [place]. [facility] niega toda garantía o responsabilidad por swenson uso de esta información.

## 2021-01-12 NOTE — MR AVS SNAPSHOT
[address] [facility] 
[phone] Patient: Bart Lan MRN: [id number] :1965 Visit Information Mai Gabriel y Tay Personal Médico Departamento Teléfono del Dep. Número de visita 3/1/2018 11:00 AM Tamanna Salazar NP [address] [phone] Follow-up Instructions Return if symptoms worsen or fail to improve. Upcoming Health Maintenance Date Due Hepatitis C Screening 1965 DTaP/Tdap/Td series (1 - Tdap) 1985 FOBT Q 1 YEAR AGE 50-75 2014 Influenza Age 5 to Adult 2017 Alergias  Review Complete El: 3/1/2018 Por: CHELSEA Fishman del:  3/1/2018 No Known Allergies Vacunas actuales Revisadas el:  2015 [place] Ear Influenza Vaccine (Quad) PF 2015 No revisadas esta visita You Were Diagnosed With   
  
 Jeffrie Mealing Urgency of urination    -  Primary ICD-10-CM: R39.15 ICD-9-CM: 844.03 Encounter for screening     ICD-10-CM: Z13.9 ICD-9-CM: V82.9 Partes vitales PS Pulso Temperatura [place] ( percentil de crecimiento) Peso (percentil de crecimiento) BMI (IMC)  
 116/72 (BP 1 Location: Right arm, BP Patient Position: Sitting) (!) 53 97.9 °F (36.6 °C) 5' 6.34\" (1.685 m) 145 lb (65.8 kg) 23.17 kg/m2 Estatus de tabaquísmo Never Smoker Historial de signos vitales BMI and BSA Data Body Mass Index Body Surface Area  
 23.17 kg/m 2 1.75 m 2 Mario Hochy eto Pharmacy Name Phone [address] [place] [address], [address] [place] [address] Rd. [phone] Garrison lista de medicamentos actualizada Sarah Beavers actualizada 3/1/18 12:50 PM.  Gillie Cai use garrison lista de medicamentos más reciente. oxybutynin 5 mg tablet También conocido cadence:  [employer]  
 Take 1 Tab by mouth two (2) times a day. Latah 1 tableta dos veces al jud  
  
 raNITIdine 75 mg tablet También conocido cadence:  ZANTAC Take 75 mg by mouth two (2) times a day. Recetas Enviado a la Elaine Refills  
 oxybutynin (DITROPAN) 5 mg tablet 11 Sig: Take 1 Tab by mouth two (2) times a day. Lata[place] 1 [facility] Care al jud Class: Normal  
 Pharm[facility] [address] [facility] [address] [facility] #: [phone] Route: Oral  
  
Hicimos lo siguiente AMB POC GLUCOSE BLOOD, BY GLUCOSE MONITORING DEVICE [33343 CPT(R)] AMB POC URINALYSIS DIP STICK MANUAL W/O MICRO [72394 CPT(R)] Instrucciones de seguimiento Return if symptoms worsen or fail to improve. Instrucciones para el Paciente Hiperplasia prostática benigna: Instrucciones de cuidado - [ Benign Prostatic Hyperplasia: Care Instructions ] Instrucciones de cuidado La hiperplasia prostática benigna (BPH, por sp siglas en inglés) es un agrandamiento de la próstata. La próstata es nata glándula pequeña que produce parte del líquido del semen. El agrandamiento de la próstata ocurre en eliazar todos los hombres a medida que envejecen. Por lo general, no es grave. La BPH no causa cáncer de próstata. A medida que la próstata se agranda, podría obstruir de Riya parcial el flujo de la [place]. Es posible que tenga dificultades para empezar o detener por completo el flujo de [place]. La BPH puede causar goteo de [place]. Es posible que tenga un flujo débil de [place], o que tenga que orinar con más frecuencia de lo habitual, sobre todo por la noche. La mayoría de los hombres encuentran que estos problemas son fáciles de [place]. Usted no necesita tratamiento a menos que sp síntomas le molesten mucho o que tenga otros problemas, tales cadence infecciones en la vejiga o cálculos. En [facility], los medicamentos podrían ayudar.  No se necesita cirugía, a menos que el flujo de orina esté bloqueado o los síntomas no mejoren con los medicamentos. La atención de seguimiento es nata parte clave de swenson tratamiento y seguridad. Asegúrese de hacer y acudir a todas las citas, y llame a swenson médico si está teniendo problemas. También es nata buena idea saber los resultados de los exámenes y mantener nata lista de los medicamentos que gabi. Cómo puede cuidarse en el hogar? · Tómese tiempo suficiente para orinar. Trate de relajarse. · Intente hacer la \"doble micción\". Orine todo lo que pueda, relájese sherri algunos momentos y [place] trate de volver a orinar. · Siéntese en el inodoro para orinar. · Melissa o piense en otras cosas mientras espera. · Polly Samreen llave de agua o trate de imaginarse el agua corriendo. A algunos hombres esto les ayuda a comenzar el flujo de [place]. · Si el goteo es un problema, lávese el pene a diario para evitar la irritación e infección de la piel. · Evite la cafeína y el alcohol. Estas bebidas aumentarán la frecuencia con la que necesita orinar. Distribuya el consumo de líquidos a lo ashleigh del día. Si el deseo urgente de orinar le despierta con frecuencia por la noche, limite el consumo de líquidos por la tarde. Orine loraine antes de ir a la cama. · Muchos medicamentos de venta nilda para el resfriado y las alergias pueden empeorar los síntomas de la BPH. Si puede, evite los antihistamínicos, los descongestionantes y las pastillas para las [place]. Melissa las advertencias del envase. · Si gabi algún medicamento recetado, en especial tranquilizantes o antidepresivos, pregúntele a swenson médico o farmacéutico si pueden causarle problemas urinarios. Podría radhika otros medicamentos que puede usar que no causan problemas urinarios. · Sea aron con los medicamentos. [employer] medicamentos exactamente cadence le fueron recetados. Llame a swenson médico si martin estar teniendo problemas con swenson medicamento. Cuándo debe pedir ayuda? Llame a swenson médico ahora mismo o busque atención médica inmediata si: 
? · No puede orinar en absoluto. ? · Presenta síntomas de nata infección urinaria. Por ejemplo: ¨ Tiene rose o pus en la orina. ¨ Tiene dolor de espalda loraine debajo de las [place]. Lucas se llama dolor en el flanco. 
¨ Tiene fiebre, escalofríos o pina por todo el cuerpo. ¨ Siente dolor al Elijah-Angela. ¨ Tiene dolor en el abdomen o la jacquelin. ?Preste especial atención a los cambios en swenson kimberley y asegúrese de comunicarse con swenson médico si: 
? · Siente dolor cuando eyacula. ? · Dary problemas urinarios empeoran mucho o le [place]. Dónde puede encontrar más información en inglés? Evangelina Haas a [email or web address] Amita Alexis O923 en la búsqueda para aprender más acerca de \"Hiperplasia prostática benigna: Instrucciones de cuidado - [ Benign Prostatic Hyperplasia: Care Instructions ]. \" 
Revisado: 14 marzo, 2017 Versión del contenido: 11.4 © 8067-8901 Healthwise, Incorporated. Las instrucciones de cuidado fueron adaptadas bajo licencia por [employer] (which disclaims liability or warranty for this information). Si usted tiene [place] afección médica o sobre estas instrucciones, siempre pregunte a swenson profesional de kimberley. Healthwise, Incorporated niega toda garantía o responsabilidad por swenson uso de esta información. Introducing [facility]! [facility] introduce portal paciente MyChart . Ahora se puede acceder a partes de swenson expediente médico, enviar por correo electrónico la oficina de swenson médico y solicitar renovaciones de medicamentos en línea. En swenson navegador de Internet , Gypsy Plan a [email or web address] Cricket clic en el usuario por Shubham Prudent? Desirae Neetu clic aquí en la sesión Tyrel Gray. Verá la página de registro [place]. Ingrese swenson código de [facility] yogesh y cadence aparece a continuación. Usted no tendrá que [employer] código después de radhika completado el proceso de registro . Si usted no se inscribe antes de la fecha de caducidad , debe solicitar un nuevo código. · MyChart Código de acceso : [id number] Expires: 5/30/2018 12:50 PM 
 
Ingresa los últimos cuatro dígitos de swenson Número de Seguro Social ( xxxx ) y fecha de nacimiento ( dd / mm / aaaa ) cadence se indica y cricket clic en Enviar. Usted será llevado a la siguiente página de registro . Crear un ID MyChart . Esta será swenson ID de inicio de sesión de MyChart y no puede ser [place] , por lo que pensar en nata que es Gerline Caroli y fácil de recordar . Crear nata contraseña MyChart . Usted puede cambiar swenson contraseña en cualquier momento . Ingrese swenson Password Reset de preguntas y [place] . [place] se puede utilizar en un momento posterior si usted olvida swenson contraseña. Introduzca swenson dirección de correo electrónico . Lavellejono Tobias recibirá nata notificación por correo electrónico cuando la nueva información está disponible en MyChart . Charyl [place] clic en Registrarse. Orlene Macy ambrocio y descargar porciones de swenson expediente médico. 
Cricket clic en el enlace de descarga del menú Resumen para descargar nata copia portátil de swenson información médica . Si tiene [employer] , por favor visite la sección de preguntas frecuentes del sitio web MyChart . Recuerde, MyChart NO es que se utilizará para las necesidades urgentes. Para emergencias médicas , llame al 911 . Ahora disponible en swenson iPhone y Android ! Por favor proporcione shila resumen de la documentación de cuidado a swenson próximo proveedor. Your primary care clinician is listed as NONE. If you have any questions after today's visit, please call [phone]. [Pacemaker] : pacemaker [VVIR] : VVIR [Longevity: ___ months] : The estimated remaining battery life is [unfilled] months [Lead Imp:  ___ohms] : lead impedance was [unfilled] ohms [Sensing Amplitude ___mv] : sensing amplitude was [unfilled] mv [___V @] : [unfilled] V [___ ms] : [unfilled] ms [None] : none [de-identified] : VR ~ 40 bpm [de-identified] : [place] [de-identified] : Assurity [id number] [de-identified] : [id number] [de-identified] : 4/27/17 [de-identified] : 59 [de-identified] : 9-10 years [de-identified] :  98%\par Brief NSVT

## 2023-05-12 RX ORDER — IBUPROFEN 800 MG/1
TABLET ORAL EVERY 8 HOURS PRN
COMMUNITY
Start: 2020-06-02

## 2023-05-12 RX ORDER — NAPROXEN 500 MG/1
TABLET ORAL 2 TIMES DAILY WITH MEALS
COMMUNITY
Start: 2018-07-21

## 2023-05-12 RX ORDER — DOXAZOSIN MESYLATE 1 MG/1
TABLET ORAL DAILY
COMMUNITY
Start: 2018-07-21

## 2023-05-12 RX ORDER — LORATADINE 10 MG/1
TABLET ORAL DAILY
COMMUNITY
Start: 2018-07-21

## 2023-05-12 RX ORDER — RANITIDINE HCL 75 MG
TABLET ORAL 2 TIMES DAILY
COMMUNITY